# Patient Record
Sex: MALE | Race: AMERICAN INDIAN OR ALASKA NATIVE
[De-identification: names, ages, dates, MRNs, and addresses within clinical notes are randomized per-mention and may not be internally consistent; named-entity substitution may affect disease eponyms.]

---

## 2018-01-28 ENCOUNTER — HOSPITAL ENCOUNTER (EMERGENCY)
Dept: HOSPITAL 5 - ED | Age: 23
LOS: 1 days | Discharge: HOME | End: 2018-01-29
Payer: SELF-PAY

## 2018-01-28 VITALS — SYSTOLIC BLOOD PRESSURE: 131 MMHG | DIASTOLIC BLOOD PRESSURE: 71 MMHG

## 2018-01-28 DIAGNOSIS — N30.00: ICD-10-CM

## 2018-01-28 DIAGNOSIS — N45.1: Primary | ICD-10-CM

## 2018-01-28 DIAGNOSIS — F12.10: ICD-10-CM

## 2018-01-28 DIAGNOSIS — F17.200: ICD-10-CM

## 2018-01-28 PROCEDURE — 93975 VASCULAR STUDY: CPT

## 2018-01-28 PROCEDURE — 81001 URINALYSIS AUTO W/SCOPE: CPT

## 2018-01-28 PROCEDURE — 99284 EMERGENCY DEPT VISIT MOD MDM: CPT

## 2018-01-28 PROCEDURE — 87086 URINE CULTURE/COLONY COUNT: CPT

## 2018-01-28 PROCEDURE — 96372 THER/PROPH/DIAG INJ SC/IM: CPT

## 2018-01-29 LAB
BILIRUB UR QL STRIP: (no result)
BLOOD UR QL VISUAL: (no result)
MUCOUS THREADS #/AREA URNS HPF: (no result) /HPF
NITRITE UR QL STRIP: (no result)
PH UR STRIP: 6 [PH] (ref 5–7)
PROT UR STRIP-MCNC: (no result) MG/DL
RBC #/AREA URNS HPF: 5 /HPF (ref 0–6)
UROBILINOGEN UR-MCNC: 2 MG/DL (ref ?–2)
WBC #/AREA URNS HPF: 70 /HPF (ref 0–6)

## 2018-01-29 NOTE — ULTRASOUND REPORT
FINAL REPORT



EXAM:  US TESTICULAR DOPPLER COMP



HISTORY:  Left-sided testicular pain and swelling.



TECHNIQUE:  Directed real-time grayscale and color Doppler

ultrasound examination of the scrotum was performed. No prior

studies are available for comparison. 



FINDINGS:  

The right testis measures 3.2 x 2.0 x 3.7 cm, and is homogeneous

in echotexture, without discrete lesion. The right epididymal

head is normal in appearance. 



The left testis measures 3.0 x 2.5 x 4.1 cm, and is homogeneous

in echotexture, without discrete lesion. The left epididymal head

is normal in appearance. However, there is relative enlargement

of the left epididymal tail, associated moderately increased

color Doppler flow. These findings in keeping with epididymitis,

and clinical correlation is recommended. 



Appropriate color vascularity is seen within both testes, without

evidence of torsion or orchitis. There is no significant

hydrocele.



IMPRESSION:  

1. Findings of epididymitis involving the left epididymal tail. 



2. Normal sonographic appearance of the bilateral testes, with no

evidence of torsion or orchitis.

## 2018-01-29 NOTE — EMERGENCY DEPARTMENT REPORT
ED Male  HPI





- General


Chief complaint: Urogenital-Male


Stated complaint: MUSCLE PAIN


Time Seen by Provider: 18 00:07


Source: patient, family


Mode of arrival: Ambulatory


Limitations: No Limitations





- History of Present Illness


Initial comments: 





Patient he reports penile swelling and soreness 1 day.  Denies any penile 

drainage or urinary burning frequency or urgency.  He reports pain 7 out of 10 

to lower back and to left testicle.  Denies any trauma.  Denies any concerns 

for STD.  Denies any fever or chills.  Denies any abdominal pain.  No over-the-

counter medication taken.


MD Complaint: testicle pain, testicle swelling


Onset/Timin


-: days(s)


Location: left testicle (and lower back)


Radiation: none


Severity: severe


Severity scale (0 -10): 7


Quality: aching, other (sore to left testicle)


Consistency: intermittent


Improves with: none


Worsens with: movement


swelling (to testicle), other (lower back pain).  denies: discharge, mass, rash

, urinary retention, blood in urine, dysuria, fever, nausea/vomiting, 

incontinence





- Related Data


Sexually active: Yes


 Previous Rx's











 Medication  Instructions  Recorded  Last Taken  Type


 


Ciprofloxacin HCl [Ciprofloxacin 500 mg PO Q12HR 10 Days #20 tab 18 

Unknown Rx





TAB]    











 Allergies











Allergy/AdvReac Type Severity Reaction Status Date / Time


 


No Known Allergies Allergy   Unverified 18 00:10














ED Review of Systems


ROS: 


Stated complaint: MUSCLE PAIN


Other details as noted in HPI





Comment: All other systems reviewed and negative


Constitutional: no symptoms reported


ENT: denies: throat pain


Respiratory: no symptoms reported


Cardiovascular: denies: chest pain, palpitations, edema, syncope


Genitourinary: testicular pain.  denies: urgency, dysuria, frequency, hematuria

, discharge, testicular mass


Musculoskeletal: back pain.  denies: joint swelling, arthralgia, myalgia


Skin: denies: rash


Neurological: denies: headache, numbness, paresthesias





ED Past Medical Hx





- Past Medical History


Previous Medical History?: No





- Surgical History


Past Surgical History?: No





- Family History


Family history: hypertension





- Social History


Smoking Status: Current Every Day Smoker


Substance Use Type: Alcohol, Marijuana





- Medications


Home Medications: 


 Home Medications











 Medication  Instructions  Recorded  Confirmed  Last Taken  Type


 


Ciprofloxacin HCl [Ciprofloxacin 500 mg PO Q12HR 10 Days #20 tab 18  

Unknown Rx





TAB]     














ED Physical Exam





- General


Limitations: No Limitations


General appearance: alert, in no apparent distress





- Head


Head exam: Present: atraumatic, normocephalic, normal inspection





- Eye


Eye exam: Present: normal appearance, PERRL, EOMI


Pupils: Present: normal accommodation





- ENT


ENT exam: Present: normal exam, normal orophraynx, mucous membranes moist





- Neck


Neck exam: Present: normal inspection, full ROM, other (no C-spine tenderness).

  Absent: tenderness, meningismus, lymphadenopathy, thyromegaly





- Respiratory


Respiratory exam: Present: normal lung sounds bilaterally.  Absent: respiratory 

distress, chest wall tenderness





- Cardiovascular


Cardiovascular Exam: Present: regular rate, normal rhythm, normal heart sounds.

  Absent: systolic murmur, diastolic murmur





- GI/Abdominal


GI/Abdominal exam: Present: soft, normal bowel sounds.  Absent: distended, 

tenderness, guarding, rebound, rigid, organomegaly, mass, bruit, pulsatile mass

, hernia





- 


 exam: Present: testicular tenderness, scrotal swelling, circumcision, other (

scrotal swelling and tender to palpate).  Absent: urethral discharge, vertical 

testicular lie


External exam: Present: swelling.  Absent: erythema, lesions, lacerations, 

ecchymosis, bleeding





- Expanded  Exam


  ** Expanded


Male  exam: Absent: phimosis, paraphimosis, penile swelling, lesions, 

induration, erythema, perineal induration, balanitis, priapism


 exam: Testicular Tenderness: Left, Testicular Swelling: Left, Epididymal 

Tenderness: Left, Cremasteric Reflex Present: Left, Right





- Extremities Exam


Extremities exam: Present: normal inspection, full ROM, normal capillary refill

, other (no clubbing, cyanosis or edema.  +2 pulses all extremities).  Absent: 

tenderness, pedal edema, joint swelling, calf tenderness





- Back Exam


Back exam: Present: normal inspection, full ROM, other (ablated without any 

difficulties).  Absent: tenderness, CVA tenderness (R), CVA tenderness (L), 

muscle spasm, paraspinal tenderness, vertebral tenderness, rash noted





- Neurological Exam


Neurological exam: Present: alert, oriented X3, normal gait, reflexes normal.  

Absent: motor sensory deficit





- Psychiatric


Psychiatric exam: Present: normal affect, normal mood





- Skin


Skin exam: Present: warm, dry, intact, normal color.  Absent: rash





ED Course


 Vital Signs











  18





  22:01 22:38


 


Temperature 98.7 F 98.7 F


 


Pulse Rate 71 69


 


Respiratory 18 16





Rate  


 


Blood Pressure 131/71 131/71


 


O2 Sat by Pulse 97 97





Oximetry  














- Reevaluation(s)


Reevaluation #1: 





18 01:52


Patient with moderate amount of leukocyte Estrace and 70+ white blood cells and 

urine.  Ultrasound of testicles revealed left epididymitis.  I discussed urine 

and ultrasound results patient and I also discuss treatment for STDs.  Patient 

wants to be treated for STD and will follow up with [facility] for testing.  Patient given Rocephin 1 g IM to cover UTI and 

gonorrhea and azithromycin 1 g by mouth to cover chlamydia.





ED Medical Decision Making





- Lab Data








 Lab Results











  18 Range/Units





  Unknown 


 


Urine Color  Yellow  (Yellow)  


 


Urine Turbidity  Clear  (Clear)  


 


Urine pH  6.0  (5.0-7.0)  


 


Ur Specific Gravity  1.020  (1.003-1.030)  


 


Urine Protein  <15 mg/dl  (Negative)  mg/dL


 


Urine Glucose (UA)  Neg  (Negative)  mg/dL


 


Urine Ketones  Neg  (Negative)  mg/dL


 


Urine Blood  Neg  (Negative)  


 


Urine Nitrite  Neg  (Negative)  


 


Urine Bilirubin  Neg  (Negative)  


 


Urine Urobilinogen  2.0  (<2.0)  mg/dL


 


Ur Leukocyte Esterase  Mod  (Negative)  


 


Urine WBC (Auto)  70.0 H  (0.0-6.0)  /HPF


 


Urine RBC (Auto)  5.0  (0.0-6.0)  /HPF


 


Urine Mucus  Few  /HPF








Urine culture pending 





- Radiology Data


Radiology results: report reviewed





Ultrasound of testicles reveal findings of epididymitis involving the left 

epididymal tail.  Normal sonographic appearance of bilateral testes with no 

evidence of torsion or orchitis





- Medical Decision Making





ED course: Patient here complaining of testicular pain and initially had no 

concern for STD.  Urinalysis reveal patient with white blood cells and moderate 

leukocyte Estrace.  Ultrasound of the testicles revealed patient with 

epididymitis left epididymal.  Discussed empiric treatment for STD with patient 

due to elevated white count a urine and also epididymitis.  Patient does have 

unprotected sex and agrees to be treated empirically for gonorrhea and 

chlamydia.  Urine culture sent and pending.  Patient given Rocephin 1 g IM to 

cover her UTI and gonorrhea and azithromycin 1 g by mouth to cover chlamydia.  

He had no adverse reaction.  I discussed the patient he needs to go to health 

department for full STD check.  Discharge diagnosis and treatment plan 

discussed patient and he voiced understanding.  STD precautions discussed.  

Patient discharged home in stable condition with prescription for ciprofloxacin 

which will cover epididymitis and UTI.  He should follow up at [facility] as he does not have a primary care physician


Critical care attestation.: 


If time is entered above; I have spent that time in minutes in the direct care 

of this critically ill patient, excluding procedure time.








ED Disposition


Clinical Impression: 


 Concern about STD in male without diagnosis, Testicular pain, left, Left 

epididymitis





UTI (urinary tract infection)


Qualifiers:


 Urinary tract infection type: acute cystitis Hematuria presence: without 

hematuria Qualified Code(s): N30.00 - Acute cystitis without hematuria





Disposition:  TO HOME OR SELFCARE


Is pt being admited?: No


Does the pt Need Aspirin: No


Condition: Stable


Instructions:  Epididymitis (ED), Urinary Tract Infection in Men (ED), Testicle 

Pain (ED), Testicular Self-examination (ED), Safe Sex (ED), Sexually 

Transmitted Diseases (ED)


Additional Instructions: 


Please practice safe sex


You're treated for gonorrhea and Chlamydia in emergency room.


Please start taking ciprofloxacin antibiotic.


Increase her fluid intake to 2-3 L of fluid per day.


Follow up at [facility] for primary care


Please do not have any sexual encounter 2 week 


Follow-up at [facility] for complete STD testing


Prescriptions: 


Ciprofloxacin HCl [Ciprofloxacin TAB] 500 mg PO Q12HR 10 Days #20 tab


Referrals: 


[facility] [Outside] - 7-10 days


[facility] [Outside] - 2-3 Days


Forms:  STI Treatment and Prevention, Work/School Release Form(ED)